# Patient Record
Sex: FEMALE | Race: ASIAN | NOT HISPANIC OR LATINO | URBAN - METROPOLITAN AREA
[De-identification: names, ages, dates, MRNs, and addresses within clinical notes are randomized per-mention and may not be internally consistent; named-entity substitution may affect disease eponyms.]

---

## 2021-02-16 ENCOUNTER — EMERGENCY (EMERGENCY)
Facility: HOSPITAL | Age: 41
LOS: 1 days | Discharge: HOME | End: 2021-02-16
Attending: STUDENT IN AN ORGANIZED HEALTH CARE EDUCATION/TRAINING PROGRAM
Payer: COMMERCIAL

## 2021-02-16 VITALS
HEART RATE: 101 BPM | SYSTOLIC BLOOD PRESSURE: 125 MMHG | RESPIRATION RATE: 16 BRPM | TEMPERATURE: 99 F | OXYGEN SATURATION: 98 % | WEIGHT: 111.99 LBS | HEIGHT: 63 IN | DIASTOLIC BLOOD PRESSURE: 74 MMHG

## 2021-02-16 VITALS
RESPIRATION RATE: 17 BRPM | SYSTOLIC BLOOD PRESSURE: 117 MMHG | OXYGEN SATURATION: 98 % | DIASTOLIC BLOOD PRESSURE: 71 MMHG | HEART RATE: 66 BPM

## 2021-02-16 LAB
ALBUMIN SERPL ELPH-MCNC: 4.6 G/DL — SIGNIFICANT CHANGE UP (ref 3.3–5)
ALP SERPL-CCNC: 74 U/L — SIGNIFICANT CHANGE UP (ref 40–120)
ALT FLD-CCNC: 15 U/L — SIGNIFICANT CHANGE UP (ref 10–45)
ANION GAP SERPL CALC-SCNC: 12 MMOL/L — SIGNIFICANT CHANGE UP (ref 5–17)
APPEARANCE UR: CLEAR — SIGNIFICANT CHANGE UP
APTT BLD: 30.7 SEC — SIGNIFICANT CHANGE UP (ref 27.5–35.5)
AST SERPL-CCNC: 21 U/L — SIGNIFICANT CHANGE UP (ref 10–40)
BACTERIA # UR AUTO: NEGATIVE — SIGNIFICANT CHANGE UP
BASOPHILS # BLD AUTO: 0.03 K/UL — SIGNIFICANT CHANGE UP (ref 0–0.2)
BASOPHILS NFR BLD AUTO: 0.3 % — SIGNIFICANT CHANGE UP (ref 0–2)
BILIRUB SERPL-MCNC: 0.8 MG/DL — SIGNIFICANT CHANGE UP (ref 0.2–1.2)
BILIRUB UR-MCNC: NEGATIVE — SIGNIFICANT CHANGE UP
BLD GP AB SCN SERPL QL: NEGATIVE — SIGNIFICANT CHANGE UP
BUN SERPL-MCNC: 12 MG/DL — SIGNIFICANT CHANGE UP (ref 7–23)
CALCIUM SERPL-MCNC: 9.4 MG/DL — SIGNIFICANT CHANGE UP (ref 8.4–10.5)
CHLORIDE SERPL-SCNC: 104 MMOL/L — SIGNIFICANT CHANGE UP (ref 96–108)
CO2 SERPL-SCNC: 23 MMOL/L — SIGNIFICANT CHANGE UP (ref 22–31)
COLOR SPEC: YELLOW — SIGNIFICANT CHANGE UP
CREAT SERPL-MCNC: 0.7 MG/DL — SIGNIFICANT CHANGE UP (ref 0.5–1.3)
DIFF PNL FLD: ABNORMAL
EOSINOPHIL # BLD AUTO: 0.05 K/UL — SIGNIFICANT CHANGE UP (ref 0–0.5)
EOSINOPHIL NFR BLD AUTO: 0.6 % — SIGNIFICANT CHANGE UP (ref 0–6)
EPI CELLS # UR: 2 /HPF — SIGNIFICANT CHANGE UP
GLUCOSE SERPL-MCNC: 106 MG/DL — HIGH (ref 70–99)
GLUCOSE UR QL: NEGATIVE — SIGNIFICANT CHANGE UP
HCG SERPL-ACNC: <2 MIU/ML — SIGNIFICANT CHANGE UP
HCT VFR BLD CALC: 38.8 % — SIGNIFICANT CHANGE UP (ref 34.5–45)
HGB BLD-MCNC: 12.8 G/DL — SIGNIFICANT CHANGE UP (ref 11.5–15.5)
HYALINE CASTS # UR AUTO: 4 /LPF — HIGH (ref 0–2)
IMM GRANULOCYTES NFR BLD AUTO: 0.2 % — SIGNIFICANT CHANGE UP (ref 0–1.5)
INR BLD: 1.04 RATIO — SIGNIFICANT CHANGE UP (ref 0.88–1.16)
KETONES UR-MCNC: ABNORMAL
LEUKOCYTE ESTERASE UR-ACNC: ABNORMAL
LYMPHOCYTES # BLD AUTO: 2.2 K/UL — SIGNIFICANT CHANGE UP (ref 1–3.3)
LYMPHOCYTES # BLD AUTO: 25.4 % — SIGNIFICANT CHANGE UP (ref 13–44)
MCHC RBC-ENTMCNC: 30.5 PG — SIGNIFICANT CHANGE UP (ref 27–34)
MCHC RBC-ENTMCNC: 33 GM/DL — SIGNIFICANT CHANGE UP (ref 32–36)
MCV RBC AUTO: 92.6 FL — SIGNIFICANT CHANGE UP (ref 80–100)
MONOCYTES # BLD AUTO: 0.48 K/UL — SIGNIFICANT CHANGE UP (ref 0–0.9)
MONOCYTES NFR BLD AUTO: 5.5 % — SIGNIFICANT CHANGE UP (ref 2–14)
NEUTROPHILS # BLD AUTO: 5.88 K/UL — SIGNIFICANT CHANGE UP (ref 1.8–7.4)
NEUTROPHILS NFR BLD AUTO: 68 % — SIGNIFICANT CHANGE UP (ref 43–77)
NITRITE UR-MCNC: NEGATIVE — SIGNIFICANT CHANGE UP
NRBC # BLD: 0 /100 WBCS — SIGNIFICANT CHANGE UP (ref 0–0)
PH UR: 6 — SIGNIFICANT CHANGE UP (ref 5–8)
PLATELET # BLD AUTO: 218 K/UL — SIGNIFICANT CHANGE UP (ref 150–400)
POTASSIUM SERPL-MCNC: 3.6 MMOL/L — SIGNIFICANT CHANGE UP (ref 3.5–5.3)
POTASSIUM SERPL-SCNC: 3.6 MMOL/L — SIGNIFICANT CHANGE UP (ref 3.5–5.3)
PROT SERPL-MCNC: 7.3 G/DL — SIGNIFICANT CHANGE UP (ref 6–8.3)
PROT UR-MCNC: ABNORMAL
PROTHROM AB SERPL-ACNC: 12.4 SEC — SIGNIFICANT CHANGE UP (ref 10.6–13.6)
RBC # BLD: 4.19 M/UL — SIGNIFICANT CHANGE UP (ref 3.8–5.2)
RBC # FLD: 12.5 % — SIGNIFICANT CHANGE UP (ref 10.3–14.5)
RBC CASTS # UR COMP ASSIST: 46 /HPF — HIGH (ref 0–4)
RH IG SCN BLD-IMP: POSITIVE — SIGNIFICANT CHANGE UP
SODIUM SERPL-SCNC: 139 MMOL/L — SIGNIFICANT CHANGE UP (ref 135–145)
SP GR SPEC: 1.03 — HIGH (ref 1.01–1.02)
UROBILINOGEN FLD QL: NEGATIVE — SIGNIFICANT CHANGE UP
WBC # BLD: 8.66 K/UL — SIGNIFICANT CHANGE UP (ref 3.8–10.5)
WBC # FLD AUTO: 8.66 K/UL — SIGNIFICANT CHANGE UP (ref 3.8–10.5)
WBC UR QL: 5 /HPF — SIGNIFICANT CHANGE UP (ref 0–5)

## 2021-02-16 PROCEDURE — 85025 COMPLETE CBC W/AUTO DIFF WBC: CPT

## 2021-02-16 PROCEDURE — 84702 CHORIONIC GONADOTROPIN TEST: CPT

## 2021-02-16 PROCEDURE — 86901 BLOOD TYPING SEROLOGIC RH(D): CPT

## 2021-02-16 PROCEDURE — 86850 RBC ANTIBODY SCREEN: CPT

## 2021-02-16 PROCEDURE — G1004: CPT

## 2021-02-16 PROCEDURE — 93975 VASCULAR STUDY: CPT

## 2021-02-16 PROCEDURE — 74177 CT ABD & PELVIS W/CONTRAST: CPT | Mod: 26,ME

## 2021-02-16 PROCEDURE — 76830 TRANSVAGINAL US NON-OB: CPT

## 2021-02-16 PROCEDURE — 76830 TRANSVAGINAL US NON-OB: CPT | Mod: 26

## 2021-02-16 PROCEDURE — 81001 URINALYSIS AUTO W/SCOPE: CPT

## 2021-02-16 PROCEDURE — 85730 THROMBOPLASTIN TIME PARTIAL: CPT

## 2021-02-16 PROCEDURE — 99285 EMERGENCY DEPT VISIT HI MDM: CPT

## 2021-02-16 PROCEDURE — 80053 COMPREHEN METABOLIC PANEL: CPT

## 2021-02-16 PROCEDURE — 99284 EMERGENCY DEPT VISIT MOD MDM: CPT | Mod: 25

## 2021-02-16 PROCEDURE — 93975 VASCULAR STUDY: CPT | Mod: 26

## 2021-02-16 PROCEDURE — 85610 PROTHROMBIN TIME: CPT

## 2021-02-16 PROCEDURE — 86900 BLOOD TYPING SEROLOGIC ABO: CPT

## 2021-02-16 PROCEDURE — 87086 URINE CULTURE/COLONY COUNT: CPT

## 2021-02-16 PROCEDURE — 74177 CT ABD & PELVIS W/CONTRAST: CPT

## 2021-02-16 NOTE — ED PROVIDER NOTE - CLINICAL SUMMARY MEDICAL DECISION MAKING FREE TEXT BOX
Vanesa Lara (DO): 40y F with no significant PMHx presents to the ED c/o intermittent right mid quadrant abdominal pain with chills for the past 4 days. VSS. Physical exam notable for localized RLQ abdominal TTP, but with no rebound or guarding. Will need to R/O appendicitis vs. other acute intra-abdominal pathology. Plan for labs, UA, Upreg, CT A/P, and reassess pending results. Vanesa Lara (DO): 40y F with no significant PMHx presents to the ED c/o intermittent right mid quadrant abdominal pain with chills for the past 4 days. VSS. Physical exam notable for localized RLQ abdominal TTP, but with no rebound or guarding. Will need to R/O appendicitis vs. ovarian torsion vs. other acute intra-abdominal or pelvic pathology. Plan for labs, UA, Upreg, CT A/P, TVUS, and reassess pending results.

## 2021-02-16 NOTE — ED PROVIDER NOTE - NSFOLLOWUPCLINICS_GEN_ALL_ED_FT
[facility]  Gastroenterology  [address] [place] [address] [address]  [place] [address]  Phone: [phone]  Fax:   Follow Up Time: 7-10 Days    [facility]  OB/GYN & Surg  [address] [place] [id number]  Phone: [phone]  Fax:   Follow Up Time: 7-10 Days

## 2021-02-16 NOTE — ED PROVIDER NOTE - PATIENT PORTAL LINK FT
You can access the FollowMyHealth Patient Portal offered by [facility] by registering at the following website: [email or web address]. By joining [facility]’s FollowMyHealth portal, you will also be able to view your health information using other applications (apps) compatible with our system.

## 2021-02-16 NOTE — ED ADULT NURSE NOTE - NSIMPLEMENTINTERV_GEN_ALL_ED
Implemented All Universal Safety Interventions:  [place] to call system. Call bell, personal items and telephone within reach. Instruct patient to call for assistance. Room bathroom lighting operational. Non-slip footwear when patient is off stretcher. Physically safe environment: no spills, clutter or unnecessary equipment. Stretcher in lowest position, wheels locked, appropriate side rails in place.

## 2021-02-16 NOTE — ED ADULT NURSE NOTE - OBJECTIVE STATEMENT
39yo F no pmh. aaox4. arrives with steady independent gait. c/o abdominal pain, RLQ, non radiating, not worsening, x 5 days. no nausea or vomiting. 1 mild episode of diarrhea. denies urinary issues, fevers or severe tenderness. good skin color. no guarding, tachycardia or signs of discomfort. able to make needs known >MD at bedside for eval.assessment will be ongoing. call bell in reach

## 2021-02-16 NOTE — ED PROVIDER NOTE - PROGRESS NOTE DETAILS
Alfredo Hood MD. pelvic exam performed with chaperone Sherley Ross (PA student). There was RIGHT adnexal tenderness but no fullness. no CMT. pt is on her menstral cycle and so tehre was vaginal bleeding. Os closed. No overt hemorrhaging. No foul vaginal discharge received signout on pt pending CTr. CT r/o w/o acute pathology. D/w Dr. Lara, cleared pt for d/c home. [place]  [place] ID#[id number] used for the conversation. 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 Made pt aware of all other results as they were documented. Pt acknowledged understanding of all results and need for outpatient f/u regarding incidental CT findings. Counseled on strict return precautions. All questions answered w/ . Stable for d/c . - Héctor Allen PA-C received signout on pt pending CTr. CT r/o w/o acute pathology. D/w Dr. Lara, cleared pt for d/c home. [place]  Venita ID#[id number] used for the conversation. 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 Made pt aware of all other results as they were documented. Pt acknowledged understanding of all results and need for outpatient f/u regarding incidental CT findings. Counseled on strict return precautions. All questions answered w/ . Stable for d/c . - Héctor Allen PA-C

## 2021-02-16 NOTE — ED PROVIDER NOTE - PHYSICAL EXAMINATION
Vanesa Lara (DO):   GEN: NAD, awake, eyes open spontaneously  EYES: normal conjunctiva, perrl  ENT: NCAT, MMM, Trachea midline  CHEST/LUNGS: Non-tachypneic, CTAB, bilateral breath sounds  CARDIAC: Non-tachycardic, normal perfusion  ABDOMEN: + Localized RLQ abdominal TTP. No rebound/guarding  SKIN: No rashes, no petechiae, no vesicles  PSYCH: Alert, appropriate, cooperative, with capacity and insight Vanesa Lara (DO):   GEN: NAD, awake, eyes open spontaneously  EYES: normal conjunctiva, perrl  ENT: NCAT, MMM, Trachea midline  CHEST/LUNGS: Non-tachypneic, CTAB, bilateral breath sounds  CARDIAC: Non-tachycardic, normal perfusion  ABDOMEN: + Localized RLQ abdominal TTP. No rebound/guarding  : Pelvic exam (done by Resident Darrel Hood MD - chaperoned by KOFI Alvarez student): ____  SKIN: No rashes, no petechiae, no vesicles  PSYCH: Alert, appropriate, cooperative, with capacity and insight Vanesa Lara (DO):   GEN: NAD, awake, eyes open spontaneously  EYES: normal conjunctiva, perrl  ENT: NCAT, MMM, Trachea midline  CHEST/LUNGS: Non-tachypneic, CTAB, bilateral breath sounds  CARDIAC: Non-tachycardic, normal perfusion  ABDOMEN: + Localized RLQ abdominal TTP. No rebound/guarding  : chaperone Sherley Ross (PA student). There was RIGHT adnexal tenderness but no fullness. no CMT. pt is on her menstral cycle and so tehre was vaginal bleeding. Os closed. No overt hemorrhaging. No foul vaginal discharge  SKIN: No rashes, no petechiae, no vesicles  PSYCH: Alert, appropriate, cooperative, with capacity and insight

## 2021-02-16 NOTE — ED PROVIDER NOTE - NSFOLLOWUPINSTRUCTIONS_ED_ALL_ED_FT
1. Follow up with your primary doctor in 1-2 days.   2. Additionally please follow up with our gastroenterologist regarding your incidental findings of a granuloma on CT scan. Additionally please follow up with either your OBGYN or our OBGYN clinic in 1 week for further evaluation/management regarding your fibroid on ultrasound.  3. Please bring a copy of all your results with you to your appointments.   4. Rest, stay hydrated. Continue current home medications as previously prescribed.   5. For pain, Take tylenol 650 mg every 6 hours as needed. Take Motrin 600 mg every 8 hours with food for additional pain relief.   6. Return to the Emergency Department immediately if you develop any new/worsening symptoms including worsening pain, fever, vomiting, inability to urinate, chest pain, shortness of breath or any other concerning symptoms.

## 2021-02-16 NOTE — ED PROVIDER NOTE - OBJECTIVE STATEMENT
40y F with no significant PMHx presents to the ED c/o sudden onset, intermittent right mid quadrant abdominal pain with chills x 4 days. Pt states that her pain can be described as a stabbing pain and is nonradiating. Denies similar episodes of symptoms in the past. Denies taking any meds for pain relief. Denies hematuria, dysuria, N/V/D, cough, SOB, vaginal bleeding, vaginal discharge, and F/C. 40y F with no significant PMHx presents to the ED c/o sudden onset, intermittent right mid quadrant abdominal pain with chills x 4 days. Pt states that her pain can be described as a stabbing pain and is nonradiating. Denies similar episodes of symptoms in the past. Denies taking any meds for pain relief. Denies hematuria, dysuria, N/V/D, cough, SOB, vaginal bleeding, vaginal discharge, and F/C.    [place] ID [id number] 40y F with no significant PMHx presents to the ED c/o sudden onset, intermittent right mid quadrant abdominal pain with chills x 4 days. Pt states that her pain can be described as a stabbing pain and is nonradiating. Denies similar episodes of symptoms in the past. Denies taking any meds for pain relief. Denies hematuria, dysuria, N/V/D, cough, SOB, vaginal discharge, and F/C.  Currently on her menstrual cycle  [place] ID [id number]

## 2021-02-17 LAB
CULTURE RESULTS: SIGNIFICANT CHANGE UP
CULTURE RESULTS: SIGNIFICANT CHANGE UP
SPECIMEN SOURCE: SIGNIFICANT CHANGE UP
SPECIMEN SOURCE: SIGNIFICANT CHANGE UP

## 2022-03-14 PROBLEM — Z78.9 OTHER SPECIFIED HEALTH STATUS: Chronic | Status: ACTIVE | Noted: 2021-02-16

## 2022-04-07 ENCOUNTER — APPOINTMENT (OUTPATIENT)
Dept: OTOLARYNGOLOGY | Facility: CLINIC | Age: 42
End: 2022-04-07

## 2023-02-23 PROBLEM — Z00.00 ENCOUNTER FOR PREVENTIVE HEALTH EXAMINATION: Status: ACTIVE | Noted: 2023-02-23
